# Patient Record
Sex: FEMALE | Race: OTHER | NOT HISPANIC OR LATINO | Employment: PART TIME | URBAN - METROPOLITAN AREA
[De-identification: names, ages, dates, MRNs, and addresses within clinical notes are randomized per-mention and may not be internally consistent; named-entity substitution may affect disease eponyms.]

---

## 2024-07-24 ENCOUNTER — OFFICE VISIT (OUTPATIENT)
Dept: URGENT CARE | Facility: PHYSICIAN GROUP | Age: 24
End: 2024-07-24

## 2024-07-24 VITALS
DIASTOLIC BLOOD PRESSURE: 84 MMHG | SYSTOLIC BLOOD PRESSURE: 128 MMHG | TEMPERATURE: 98.1 F | RESPIRATION RATE: 16 BRPM | BODY MASS INDEX: 23.47 KG/M2 | HEART RATE: 84 BPM | HEIGHT: 69 IN | OXYGEN SATURATION: 94 % | WEIGHT: 158.5 LBS

## 2024-07-24 DIAGNOSIS — J03.90 EXUDATIVE TONSILLITIS: ICD-10-CM

## 2024-07-24 PROCEDURE — 3074F SYST BP LT 130 MM HG: CPT | Performed by: PHYSICIAN ASSISTANT

## 2024-07-24 PROCEDURE — 99203 OFFICE O/P NEW LOW 30 MIN: CPT | Performed by: PHYSICIAN ASSISTANT

## 2024-07-24 PROCEDURE — 3079F DIAST BP 80-89 MM HG: CPT | Performed by: PHYSICIAN ASSISTANT

## 2024-07-24 RX ORDER — AMOXICILLIN 500 MG/1
500 CAPSULE ORAL 2 TIMES DAILY
Qty: 20 CAPSULE | Refills: 0 | Status: SHIPPED | OUTPATIENT
Start: 2024-07-24 | End: 2024-08-03

## 2024-07-24 ASSESSMENT — ENCOUNTER SYMPTOMS
MYALGIAS: 0
FEVER: 0
SPUTUM PRODUCTION: 0
NAUSEA: 0
ABDOMINAL PAIN: 0
SINUS PAIN: 0
CHILLS: 0
WHEEZING: 0
COUGH: 0
VOMITING: 0
SORE THROAT: 1
PALPITATIONS: 0
DIZZINESS: 0
SHORTNESS OF BREATH: 0
DIAPHORESIS: 0
HEADACHES: 0
DIARRHEA: 0

## 2024-08-10 ENCOUNTER — HOSPITAL ENCOUNTER (OUTPATIENT)
Facility: CLINIC | Age: 24
Setting detail: OBSERVATION
Discharge: HOME | End: 2024-08-11
Attending: EMERGENCY MEDICINE | Admitting: EMERGENCY MEDICINE
Payer: COMMERCIAL

## 2024-08-10 DIAGNOSIS — F60.3 BORDERLINE PERSONALITY DISORDER (H): ICD-10-CM

## 2024-08-10 DIAGNOSIS — F41.1 GAD (GENERALIZED ANXIETY DISORDER): ICD-10-CM

## 2024-08-10 DIAGNOSIS — F43.10 PTSD (POST-TRAUMATIC STRESS DISORDER): ICD-10-CM

## 2024-08-10 PROCEDURE — 250N000013 HC RX MED GY IP 250 OP 250 PS 637: Performed by: EMERGENCY MEDICINE

## 2024-08-10 PROCEDURE — 99285 EMERGENCY DEPT VISIT HI MDM: CPT

## 2024-08-10 RX ORDER — LORAZEPAM 1 MG/1
1 TABLET ORAL ONCE
Status: COMPLETED | OUTPATIENT
Start: 2024-08-10 | End: 2024-08-10

## 2024-08-10 RX ADMIN — LORAZEPAM 1 MG: 1 TABLET ORAL at 23:45

## 2024-08-10 ASSESSMENT — COLUMBIA-SUICIDE SEVERITY RATING SCALE - C-SSRS
2. HAVE YOU ACTUALLY HAD ANY THOUGHTS OF KILLING YOURSELF IN THE PAST MONTH?: NO
6. HAVE YOU EVER DONE ANYTHING, STARTED TO DO ANYTHING, OR PREPARED TO DO ANYTHING TO END YOUR LIFE?: NO
1. IN THE PAST MONTH, HAVE YOU WISHED YOU WERE DEAD OR WISHED YOU COULD GO TO SLEEP AND NOT WAKE UP?: NO

## 2024-08-10 ASSESSMENT — ACTIVITIES OF DAILY LIVING (ADL): ADLS_ACUITY_SCORE: 33

## 2024-08-11 VITALS
DIASTOLIC BLOOD PRESSURE: 57 MMHG | WEIGHT: 159.9 LBS | HEART RATE: 73 BPM | RESPIRATION RATE: 16 BRPM | OXYGEN SATURATION: 94 % | BODY MASS INDEX: 22.89 KG/M2 | HEIGHT: 70 IN | TEMPERATURE: 98.7 F | SYSTOLIC BLOOD PRESSURE: 100 MMHG

## 2024-08-11 PROBLEM — F41.9 ANXIETY: Status: ACTIVE | Noted: 2024-08-11

## 2024-08-11 PROBLEM — F43.10 POSTTRAUMATIC STRESS DISORDER: Status: ACTIVE | Noted: 2024-08-11

## 2024-08-11 PROBLEM — F41.9 ANXIETY: Status: RESOLVED | Noted: 2024-08-11 | Resolved: 2024-08-11

## 2024-08-11 PROBLEM — F43.29 ADJUSTMENT DISORDER WITH MIXED EMOTIONAL FEATURES: Status: ACTIVE | Noted: 2024-08-11

## 2024-08-11 PROBLEM — F43.29 ADJUSTMENT DISORDER WITH MIXED EMOTIONAL FEATURES: Status: RESOLVED | Noted: 2024-08-11 | Resolved: 2024-08-11

## 2024-08-11 PROBLEM — F41.1 GENERALIZED ANXIETY DISORDER: Status: ACTIVE | Noted: 2024-08-11

## 2024-08-11 PROBLEM — F60.3 BORDERLINE PERSONALITY DISORDER (H): Status: ACTIVE | Noted: 2024-08-11

## 2024-08-11 LAB — HCG UR QL: NEGATIVE

## 2024-08-11 PROCEDURE — 250N000013 HC RX MED GY IP 250 OP 250 PS 637: Performed by: NURSE PRACTITIONER

## 2024-08-11 PROCEDURE — G0378 HOSPITAL OBSERVATION PER HR: HCPCS

## 2024-08-11 PROCEDURE — 250N000013 HC RX MED GY IP 250 OP 250 PS 637: Performed by: PSYCHIATRY & NEUROLOGY

## 2024-08-11 PROCEDURE — 81025 URINE PREGNANCY TEST: CPT | Performed by: NURSE PRACTITIONER

## 2024-08-11 PROCEDURE — 99222 1ST HOSP IP/OBS MODERATE 55: CPT | Performed by: NURSE PRACTITIONER

## 2024-08-11 RX ORDER — HYDROXYZINE HYDROCHLORIDE 25 MG/1
25 TABLET, FILM COATED ORAL EVERY 6 HOURS PRN
Status: DISCONTINUED | OUTPATIENT
Start: 2024-08-11 | End: 2024-08-11 | Stop reason: HOSPADM

## 2024-08-11 RX ORDER — HYDROXYZINE HYDROCHLORIDE 50 MG/1
50 TABLET, FILM COATED ORAL EVERY 6 HOURS PRN
Status: DISCONTINUED | OUTPATIENT
Start: 2024-08-11 | End: 2024-08-11

## 2024-08-11 RX ORDER — OLANZAPINE 10 MG/2ML
10 INJECTION, POWDER, FOR SOLUTION INTRAMUSCULAR 2 TIMES DAILY PRN
Status: DISCONTINUED | OUTPATIENT
Start: 2024-08-11 | End: 2024-08-11 | Stop reason: HOSPADM

## 2024-08-11 RX ORDER — OLANZAPINE 10 MG/1
10 TABLET, ORALLY DISINTEGRATING ORAL 2 TIMES DAILY PRN
Status: DISCONTINUED | OUTPATIENT
Start: 2024-08-11 | End: 2024-08-11 | Stop reason: HOSPADM

## 2024-08-11 RX ORDER — ACETAMINOPHEN 325 MG/1
650 TABLET ORAL EVERY 4 HOURS PRN
Status: DISCONTINUED | OUTPATIENT
Start: 2024-08-11 | End: 2024-08-11 | Stop reason: HOSPADM

## 2024-08-11 RX ORDER — HYDROXYZINE HYDROCHLORIDE 25 MG/1
25 TABLET, FILM COATED ORAL EVERY 8 HOURS PRN
Qty: 30 TABLET | Refills: 0 | Status: SHIPPED | OUTPATIENT
Start: 2024-08-11 | End: 2024-08-11

## 2024-08-11 RX ORDER — TRAZODONE HYDROCHLORIDE 50 MG/1
50 TABLET, FILM COATED ORAL
Status: DISCONTINUED | OUTPATIENT
Start: 2024-08-11 | End: 2024-08-11 | Stop reason: HOSPADM

## 2024-08-11 RX ORDER — TRAZODONE HYDROCHLORIDE 50 MG/1
50 TABLET, FILM COATED ORAL
Qty: 30 TABLET | Refills: 0 | Status: SHIPPED | OUTPATIENT
Start: 2024-08-11 | End: 2024-08-11

## 2024-08-11 RX ORDER — HYDROXYZINE HYDROCHLORIDE 25 MG/1
25 TABLET, FILM COATED ORAL EVERY 8 HOURS PRN
Qty: 30 TABLET | Refills: 0 | Status: SHIPPED | OUTPATIENT
Start: 2024-08-11

## 2024-08-11 RX ORDER — LOPERAMIDE HCL 2 MG
2 CAPSULE ORAL 4 TIMES DAILY PRN
Status: DISCONTINUED | OUTPATIENT
Start: 2024-08-11 | End: 2024-08-11 | Stop reason: HOSPADM

## 2024-08-11 RX ORDER — ARIPIPRAZOLE 5 MG/1
TABLET ORAL
Qty: 30 TABLET | Refills: 0 | Status: SHIPPED | OUTPATIENT
Start: 2024-08-11 | End: 2024-08-11

## 2024-08-11 RX ORDER — TRAZODONE HYDROCHLORIDE 50 MG/1
50 TABLET, FILM COATED ORAL
Qty: 30 TABLET | Refills: 0 | Status: SHIPPED | OUTPATIENT
Start: 2024-08-11

## 2024-08-11 RX ORDER — ARIPIPRAZOLE 5 MG/1
TABLET ORAL
Qty: 30 TABLET | Refills: 0 | Status: SHIPPED | OUTPATIENT
Start: 2024-08-11

## 2024-08-11 RX ORDER — ARIPIPRAZOLE 5 MG/1
2.5 TABLET ORAL DAILY
Status: DISCONTINUED | OUTPATIENT
Start: 2024-08-11 | End: 2024-08-11 | Stop reason: HOSPADM

## 2024-08-11 RX ORDER — ONDANSETRON 4 MG/1
4 TABLET, ORALLY DISINTEGRATING ORAL EVERY 6 HOURS PRN
Status: DISCONTINUED | OUTPATIENT
Start: 2024-08-11 | End: 2024-08-11 | Stop reason: HOSPADM

## 2024-08-11 RX ORDER — IBUPROFEN 600 MG/1
600 TABLET, FILM COATED ORAL EVERY 6 HOURS PRN
Status: DISCONTINUED | OUTPATIENT
Start: 2024-08-11 | End: 2024-08-11 | Stop reason: HOSPADM

## 2024-08-11 RX ORDER — LANOLIN ALCOHOL/MO/W.PET/CERES
3 CREAM (GRAM) TOPICAL
COMMUNITY

## 2024-08-11 RX ADMIN — HYDROXYZINE HYDROCHLORIDE 25 MG: 25 TABLET, FILM COATED ORAL at 16:17

## 2024-08-11 RX ADMIN — ARIPIPRAZOLE 2.5 MG: 5 TABLET ORAL at 13:37

## 2024-08-11 RX ADMIN — ACETAMINOPHEN 650 MG: 325 TABLET, FILM COATED ORAL at 14:27

## 2024-08-11 ASSESSMENT — ACTIVITIES OF DAILY LIVING (ADL)
ADLS_ACUITY_SCORE: 35

## 2024-08-11 ASSESSMENT — COLUMBIA-SUICIDE SEVERITY RATING SCALE - C-SSRS
REASONS FOR IDEATION SINCE LAST CONTACT: COMPLETELY TO END OR STOP THE PAIN (YOU COULDN'T GO ON LIVING WITH THE PAIN OR HOW YOU WERE FEELING)
2. HAVE YOU ACTUALLY HAD ANY THOUGHTS OF KILLING YOURSELF?: NO
1. SINCE LAST CONTACT, HAVE YOU WISHED YOU WERE DEAD OR WISHED YOU COULD GO TO SLEEP AND NOT WAKE UP?: YES
SUICIDE, SINCE LAST CONTACT: NO
ATTEMPT SINCE LAST CONTACT: NO
TOTAL  NUMBER OF ABORTED OR SELF INTERRUPTED ATTEMPTS SINCE LAST CONTACT: NO
6. HAVE YOU EVER DONE ANYTHING, STARTED TO DO ANYTHING, OR PREPARED TO DO ANYTHING TO END YOUR LIFE?: NO
TOTAL  NUMBER OF INTERRUPTED ATTEMPTS SINCE LAST CONTACT: NO

## 2024-08-11 NOTE — ED PROVIDER NOTES
"  Emergency Department Note      History of Present Illness     Chief Complaint   Suicidal    HPI   Kavita Hernandez is a 24 year old female presenting to the ED for psychiatric evaluation. The patient reports that she has been diagnosed with bipolar disorder but firmly believes she has borderline personality disorder. She was unable to provide a specific stressor, but she woke up today feeling overwhelmed and kept repeating that she \"does not want to feel like this anymore\". She explains that her father killed himself a couple years ago, and she does not want to go down the same road, however she fears she will without intervention. The patient adds that Prozac has made her suicidal ideation increase in the past, so she stopped taking it. She is not currently taking any medications and states that she does not have insurance. The patient endorses drug and alcohol use, specifying past use of mushrooms and MDMA, as well as current use of THC. She last used THC yesterday. No medication allergies or other medical concerns at this time, though she states she may have had a mild case of food poisoning earlier in the week.     Independent Historian   None    Review of External Notes   None    Past Medical History     Medical History and Problem List   Bipolar disorder     Medications   The patient is currently on no regular medications.     Physical Exam     Patient Vitals for the past 24 hrs:   BP Temp Temp src Pulse Resp SpO2 Weight   08/10/24 2259 124/64 98.2  F (36.8  C) Oral 58 16 96 % 72.6 kg (160 lb)     Physical Exam  Eyes:  The pupils are equal and round    Conjunctivae and sclerae are normal  ENT:    The nose is normal    Pinnae are normal  CV:  Regular rate and rhythm     No edema  Resp:  Normal respiratory effort. Speaks in full sentencesNon-labored  MS:  Normal muscular tone    No asymmetric leg swelling  Skin:  No rash or acute skin lesions noted  Neuro:   Awake, alert.      Speech is normal and " fluent.    Face is symmetric.     Moves all extremities  Psych: Crying and endorsing depression with suicidal thoughts. Denies plan. Does not appear to be responding to internal stimuli    Diagnostics     Lab Results   Labs Ordered and Resulted from Time of ED Arrival to Time of ED Departure - No data to display    Imaging   No orders to display     Independent Interpretation   None    ED Course      Medications Administered   Medications   LORazepam (ATIVAN) tablet 1 mg (has no administration in time range)       Discussion of Management   None    ED Course   ED Course as of 08/10/24 2333   Sat Aug 10, 2024   2313 I obtained history and examined the patient as noted above. I explained findings to the patient and we discussed plan for transfer to [facility]. The patient is comfortable with this plan.        Additional Documentation  None    Medical Decision Making / Diagnosis     CMS Diagnoses: None    MIPS       None    MDM   Kavita Hernandez is a 24 year old female who presents to the emergency department with concerns about anxiety and mental health.  She reports that she has had a long day today and has been feeling very stressed.  She notes that when she gets stressed like this she has suicidal thoughts.  She denies specific plan.  She is here to try to get some help.  She had been trying to avoid using any substances recently.  Her last marijuana use was yesterday.  She had been on medications for bipolar disorder previously but is stopped after getting increasing suicidal ideation.  She is hoping to have help with diagnosis and thinks that she might have borderline personality disorder.  Patient initially agreeable to [facility].  She then later did not want to go.  Will have mental health assessment performed here in the ED instead. Patient signed out to my partner awaiting DEC evaluation.    Disposition   The patient was transferred to [facility].     Diagnosis     ICD-10-CM    1. Anxiety  F41.9          Scribe  Disclosure:  I, Jodie Estebanjaspreetvictor hugo, am serving as a scribe at 11:32 PM on 8/10/2024 to document services personally performed by Hadley Bowens MD based on my observations and the provider's statements to me.        Hadley Bowens MD  08/11/24 0112
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
## 2024-08-11 NOTE — PLAN OF CARE
Kavita HURST Mary  August 11, 2024  Plan of Care Hand-off Note     Patient Care Path: observation    Plan for Care:   Pt is help seeking and wanting to get resources to better manage her emotions. She is open to transferring to [facility] for further stabilization, assessment and meeting with psychiatry provided in the AM. Pt states that she is open to medication and referrals for DBT programs. Pt is denying SI/HI/SIB.   A lower level of care has been unsuccessful in treating and stabilizing patient s mental health symptoms. Patient will remain on [facility] unit under observation for continued monitoring, treatment and therapeutic intervention of mental health symptoms. Observation at [facility] could help mitigate the need for a more restrictive level of care in an inpatient setting.    Identified Goals and Safety Issues: decrease symptoms, engage in safety planning    Overview:  Kenneth Simmons, step-parent, [phone] Severo, significant other, ([phone])    Legal Status: Legal Status at Admission: Voluntary/Patient has signed consent for treatment    Psychiatry Consult: pt will meet with psychiatry provider at [facility] in the AM on 8/11/24       Updated Dr. Yang and RN regarding plan of care.      Yocasta Coulter, [facility], LADC

## 2024-08-11 NOTE — ED NOTES
[facility] staff came to get her, and she decided she would rather stay here to be with her friends and wait for DEC assessment.
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
## 2024-08-11 NOTE — ED PROVIDER NOTES
[facility] Unit - Combined Psychiatric Observation and Discharge note  [facility] Emergency Department  Observation Initiation Date: Aug 10, 2024    Kavita Hernandez MRN: [id number]   Age: 24 year old YOB: 2000     History     Chief Complaint   Patient presents with    Suicidal     HPI  Kavita Hernandez is a 24 year old female with a past history notable for anxiety, depression, PTSD, borderline personality disorder, and polysubstance abuse.  Patient presented to the emergency department for evaluation of irritability, agitation, and mood lability.  Patient was medically evaluated in the emergency department and determined to be medically stable for transfer to [facility] for further psychiatric assessment.  Patient is nearing 13 hours in emergency care at this time.    Here at [facility], patient was noted to have arrived to the unit after midnight, and was initially mildly agitated.  She was evaluated by the [facility] and eventually able to obtain some rest.  On interview today, patient reports a long history of mood instability, agitation, irritability.  She reports that she tends to catastrophize situations, and her mind thinks of worst case scenarios.  She reports that she lashes out verbally at others at times.  She has difficulty managing her emotions and distress.  When faced with an acute stressor, it tends to lead to a significant change in her mood.  She feels anxious and on edge with a pit in her stomach during periods of heightened emotional distress.  It takes her quite some time to reregulate when becoming emotionally dysregulated.  She endorses history of using various substances, including alcohol, THC, MDMA, psilocybin, and nicotine. She reports she last used alcohol about 1 week ago. States she uses alcohol more frequently while she is in [place] working. 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 She is working on reducing her alcohol use. She is also working to  "reduce her cannabis use. Reports she quit her nicotine vape about 3 days ago.  She reports she used MDMA last weekend, and wonders if this led to increased mood lability and agitation.  Patient notes that when she is at home in [place], she generally sleeps well from about midnight until 8 AM.  When she is in [place], she endorses a more difficult time falling and staying asleep.  Appetite is stable.  Reports she has trialed sertraline, fluoxetine, and Strattera in the past.  She felt as though fluoxetine led to increased agitation and thoughts of suicide.  She would like to avoid SSRIs.  We spoke about using a mood stabilizer, to which she was agreeable.  She currently denies any suicidal thoughts, plans, or intent.  There is no evidence of homicidal thinking, psychosis, or maxim.    Past Medical History  No past medical history on file.  No past surgical history on file.  melatonin 3 MG tablet      Allergies   Allergen Reactions    Walnuts [Nuts] Swelling     Family History  No family history on file.  Social History           Review of Systems  A medically appropriate review of systems was performed with pertinent positives and negatives noted in the HPI, and all other systems negative.    Physical Examination   BP: 124/64  Pulse: 58  Temp: 98.2  F (36.8  C)  Resp: 16  Height: 177.8 cm (5' 10\")  Weight: 72.6 kg (160 lb)  SpO2: 96 %    Physical Exam  General: Appears stated age.   Neuro: Alert and fully oriented. Extremities appear to demonstrate normal strength on visual inspection.   Integumentary/Skin: no rash visualized, normal color    Psychiatric Examination   Appearance: awake, alert, adequately groomed, appeared as age stated, and casually dressed  Attitude:  cooperative  Eye Contact:  fair  Mood:  anxious  Affect:  mood congruent and intensity is normal  Speech:  clear, coherent and normal prosody  Psychomotor Behavior:  no evidence of tardive dyskinesia, dystonia, or tics  Thought Process:  linear and " goal oriented  Associations:  no loose associations  Thought Content:  no evidence of suicidal ideation or homicidal ideation, no evidence of psychotic thought, no auditory hallucinations present, and no visual hallucinations present  Insight:  fair  Judgement:  fair  Oriented to:  time, person, and place  Attention Span and Concentration:  fair  Recent and Remote Memory:  intact  Language: able to name/identify objects without impairment  Fund of Knowledge: intact with awareness of current and past events    ED Course     ED Course as of 08/11/24 1154   Sat Aug 10, 2024   1304 I obtained history and examined the patient as noted above. I explained findings to the patient and we discussed plan for transfer to [facility]. The patient is comfortable with this plan.          Labs Ordered and Resulted from Time of ED Arrival to Time of ED Departure - No data to display    Assessments & Plan (with Medical Decision Making)   Patient presenting with anxiety, irritability, emotional lability and reactivity which has been chronic in nature, but recently worsening. Pt notes she took MDMA last weekend, which may have led to an acute worsening in agitation and anxiety, prompting her to seek help for her chronic symptoms. Pt agrees with the diagnosis of borderline personality disorder, noting she has looked at the diagnostic criteria and believes she fits all criteria. She would benefit from DBT therapy, as well as trauma therapy in the future when able to better manage distressing emotions. Nursing notes reviewed noting no acute issues.     I have reviewed the assessment completed by the [facility].     During the observation period, the patient did not require medications for agitation, and did not require restraints/seclusion for patient and/or provider safety.     The patient was found to have a psychiatric condition that would benefit from an observation stay in the emergency department for further psychiatric stabilization and/or  coordination of a safe disposition. The observation plan includes serial assessments of psychiatric condition, potential administration of medications if indicated, further disposition pending the patient's psychiatric course during the monitoring period.     Preliminary diagnosis:    ICD-10-CM    1. PTSD (post-traumatic stress disorder)  F43.10       2. Borderline personality disorder (H)  F60.3       3. NAVYA (generalized anxiety disorder)  F41.1            Treatment Plan:  - Start Abilify 2.5 mg daily for mood stabilization, with plan to increase to 5 mg daily in the next couple of days. R/b/ae discussed.   - Hydroxyzine 25 mg q6h prn for acute anxiety  - Trazodone 50 mg at bedtime prn for insomnia  - Will order urine hcg  - Pt would benefit from DBT for emotional regulation and distress tolerance skills  - Patient will remain on observation status overnight, plan for reassessment tomorrow    Patient evaluated by Rich Andres CNP on 8/11/24. Plan of care discussed with nursing and LMHP.     ADDENDUM @ 1924:  Initially, patient had planned to stay overnight on observation. This evening, she approached nursing staff to request discharge, concerned that she would be unable to sleep here. No current safety concerns. Will send prescriptions. Outpatient appointments have been scheduled. Patient discharged with safety plan in place.       After a period of working with the treatment team on the EmPATH unit, the patient's mental state improved to allow a safe transition to outpatient care. After counseling on the diagnosis, work-up, and treatment plan, the patient was discharged. Close follow-up with a psychiatrist and/or therapist was recommended and community psychiatric resources were provided. Patient is to return to the ED if any urgent or potentially life-threatening concerns.     At the time of discharge, the patient's acute suicide risk was determined to be low due to the following factors: Reduction in the  intensity of mood/anxiety symptoms that preceded the admission, denial of suicidal thoughts, denies feeling helpless or helpless, not currently under the influence of alcohol or illicit substances, denies experiencing command hallucinations, no immediate access to firearms. The patient's acute risk could be higher if noncompliant with their treatment plan, medications, follow-up appointments or using illicit substances or alcohol. Protective factors include: social supports, stable housing, employment      --  Rich Andres CNP   [facility] EMERGENCY DEPT  EmPATH Unit      Rich Andres CNP  08/11/24 1927

## 2024-08-11 NOTE — ED NOTES
[facility]  ED to EMPATH Checklist:      Goal for EMPATH: Anxiety management and Suicidality    Current Behavior: Anxious, Excitable, Sad, and Cooperative    Safety Concerns: Suicidal, no plan    Legal Hold Status: Voluntary    Medically Cleared by ED provider: Yes    Patient Therapeutically Searched: Refused    Belongings: Remain with patient    Independent Ambulation at Baseline: Yes/No: Yes    Participates in Care/Conversation: Yes/No: Yes    Patient Informed about EMPATH: Yes/No: Yes    DEC: ordered    Patient Ready to be Transferred to EMPATH? Yes/No: Yes

## 2024-08-11 NOTE — DISCHARGE INSTRUCTIONS
Your Upcoming Appointments:  Medication Management    Type: Medication Mgmt - Initial (In-Person)  Date: Wednesday, 8/14/2024  Time: 11:00 am - 12:00 pm  Provider: Stanislav Cazares MA, CNP,RN  Location: [facility], [address], [place] [address]  Phone: [phone]  Patient Instructions: Please fill New Patient Form by using following link. All forms need to be completed 24 hours prior to the appointment date/time by going to [email or web address] Please call us on [phone] 96 hours prior to your scheduled appointment to confirm that you are able to attend. We will provide you information about how to log into video call software when you call.    ----------------------------------------------------------------------------    Individual Therapy    Type: Therapy - Initial (In-Person)  Date: Friday, 8/16/2024  Time: 10:00 am - 11:00 am  Provider: Jose Luis DICKSON  [facility]  Location: [facility], [address] [place] [address] [address], [place] [address]  Phone: [phone]  Scheduling Instructions: Please get EMAIL for ALL appts! New Client Paperwork can be downloaded at [email or web address]    Intensive Outpatient Programming    Please see the attached list of upcoming appointments for your information for your scheduled diagnostic assessment, which is the first step toward accessing intensive outpatient programming through [facility].    Aftercare Plan    Follow up with established providers and supports as scheduled. Continue taking medications as prescribed. Abstain from drugs and alcohol. Utilize your [facility] mental health crisis team as needed. They are available 24/7. Contact information is listed below.     If I am feeling unsafe or I am in a crisis, I will:   Contact my established care providers  Kurtis Chacon COPE: PH: [phone]   Call the National Suicide Prevention Lifeline: 988  Go to the nearest emergency room    Call 911     Warning signs that I or other people might notice when a crisis is developing for me: changes to sleep, appetite or mood, increased anger, agitation or irritability, feeling depressed or hopeless, spending more time alone or talking less, increased crying, decreased productivity, seeing or hearing things that aren't there, thoughts of not wanting to live anymore or of actually killing myself, thoughts of hurting others    Things I am able to do on my own to cope or help me feel better: watching a favorite tv show or movie, listening to music I enjoy, going outside and breathing fresh air, going for a walk or exercising, taking a shower or bath, a cold or hot beverage, a healthy snack, drawing/coloring/painting, journaling, singing or dancing, deep breathing     I can try practicing square breathing when I begin to feel anxious - inhale through the nose for the count of 4 and the first line on the square. Exhale through the mouth for the count of 4 for the second line of the square. Repeat to complete the square. Repeat the square as many times as needed.    I can also use my five senses to practice mindfulness and grounding. What are five things I can see, four things I can hear, three things I can feel, two things I can smell, and one thing I can taste.     Things that I am able to do with others to cope or help me feel better: sometimes just talking or spending time with someone else, sharing a meal or having coffee, watching a movie or playing a game, going for a walk or exercising    I can also use community resources including mental health hotlines, [facility] crisis teams, or apps.     Things I can use or do for distraction: movies/tv, music, reading, games, drawing/coloring/painting or other art, essential oils, exercise, cleaning/organizing, puzzles, crossword puzzles, word search, Sudoku       I can also download a meditation or relaxation chandu, like Calm, Headspace, or Insight Timer (all three  "offer a free version)    Changes I can make to support my mental health and wellness: Attend scheduled mental health therapy and psychiatric appointments. Take my medications as prescribed. Maintain a daily schedule/routine. Abstain from all mood altering substances, including drugs, alcohol, or medications not currently prescribed to me. Implement a self-care routine.      People in my life that I can ask for help: friends or family, trusted teachers/staff/colleagues, trusted members of my community or place of Religion, mental health crisis lines, or 911    Your [facility] has a mental health crisis team you can call 24/7: [facility], [phone]    Other things that are important when I m in crisis: to remember that the feelings I am having right now are temporary, and it won't feel like this forever, and that it is okay and important to ask for help    Crisis Lines  Crisis Text Line  Text 685454  You will be connected with a trained live crisis counselor to provide support.    Por espanol, texto  PRAKASH a 030420 o texto a 442-AYUDAME en WhatsApp    National Hope Line  1.800.SUICIDE [7643151]      Community Resources  Fast Tracker  Linking people to mental health and substance use disorder resources  fasttrackermn.org     [place] Mental Health Warm Line  Peer to peer support  Monday thru Saturday, 12 pm to 10 pm  125.076.9825 or 0.643.450.5913  Text \"Support\" to 74259    National Kinsey on Mental Illness (MERY)  453.231.5782 or 1.888.MERY.HELPS      Mental Health Apps  My3  https://my3app.org/    VirtualHopeBox  https://[employer].org/apps/virtual-hope-box/      Additional Information  Today you were seen by a licensed mental health professional through Triage and Transition services, [facility] ([facility])  for a crisis assessment in the Emergency Department at [facility].  It is recommended that you follow up with your established providers (psychiatrist, mental " health therapist, and/or primary care doctor - as relevant) as soon as possible. Coordinators from [facility] will be calling you in the next 24-48 hours to ensure that you have the resources you need.  You can also contact [facility] coordinators directly at [phone]. You may have been scheduled for or offered an appointment with a mental health provider. [facility] maintains an extensive network of licensed behavioral health providers to connect patients with the services they need.  We do not charge providers a fee to participate in our referral network.  We match patients with providers based on a patient's specific needs, insurance coverage, and location.  Our first effort will be to refer you to a provider within your care system, and will utilize providers outside your care system as needed.

## 2024-08-11 NOTE — PROGRESS NOTES
"Patient was agitated. Yelling \"This is bullshit\" while I was explaining to her about Empath. She told me was was uncomfortable doing a clothing search as it was done in a unprofessional manner at another hospital. She angrily ripped out the strings in her hoody when  I told her strings were not allowed on the unit.  She declined Empath stating she does \"not want to be around others in crisis\" as she feels it would be too agitating to her. She was uncomfortable with the security check/clothing search and  did not want to be with out her blanket, skin care lotions and her visitors.  ED RN notified.   "

## 2024-08-11 NOTE — ED TRIAGE NOTES
"Pt c/o mental health crissis. Pt endorses SI without a plan. Pt states \"I need some fucking help right now\". Pt was redirected. Pt seeking EMPATH     Triage Assessment (Adult)       Row Name 08/10/24 2069          Triage Assessment    Airway WDL WDL        Respiratory WDL    Respiratory WDL WDL        Skin Circulation/Temperature WDL    Skin Circulation/Temperature WDL WDL        Cardiac WDL    Cardiac WDL WDL        Peripheral/Neurovascular WDL    Peripheral Neurovascular WDL WDL        Cognitive/Neuro/Behavioral WDL    Cognitive/Neuro/Behavioral WDL WDL                     "

## 2024-08-11 NOTE — ED NOTES
Pt has been present in the milieu throughout the shift. Pt attended group and made a goal for herself that she would get a treatment plan/resources in place and start medications so that she can feel better.   Pt denies SI today and reports feeling better this morning after getting medication for her anxiety last night. Pt was started on abilify and took her first dose. Pt tolerated the medication okay but did report some blurry vision along with a mild headache. Provider was notified and patient was given PRN tylenol. Plan is for patient to stay on observation tonight, try trazodone for sleep and with reassess tomorrow.

## 2024-08-11 NOTE — PROGRESS NOTES
"Patient states she has not felt like her self since  when her dad . He \"slit his wrists and shot himself on [address].\" States she has struggled with depression since that time. She came into the ER today because she is \"tired of feeling ashamed and angry all the time.\" And felt like she was \"spiraling\" down today. States she is \"ready to get  help and want help. I'm tired of feeling angry all the time\"  She denies SI as she \"doesn't want to go the same way as my chicken shit dad.\" But she does state \"I wish I wasn't alive\". She states she has limited social support and says \"all my friends stopped talking to me after my dad  because they didn't know what to say. But maybe they stopped talking to me because I am a terrible person.\" She is very tearful during intake. Her mood is liable. She is cooperative. States she has not taken any MH medications in 8 years. States she has tried Prozac but it made her suicidal so she stopped taking it and never went back to the provider. She does not have insurance. She alternates working 2 weeks at a [employer] in [place]  and then lives here in [place] for two weeks and then back again. Denies AH/VH/SI/HI.  Of note, she tells me that she took MDMA and Laila on Sat, smokes THC daily and quit Vaping \"cold\" turkey 3 days ago  "

## 2024-08-11 NOTE — PROGRESS NOTES
"27 year old female with history of Depression received from ED due to mood instability. Reports having mood \"all over the place\" . denies SI/HI. Nursing and risk assessments completed. Assessments reviewed with LMHP and physician. Admission information reviewed with patient. Patient given a tour of EmPATH and instructions on using the facility. Questions regarding EmPATH addressed. Pt safety search completed.    "

## 2024-08-12 ENCOUNTER — TELEPHONE (OUTPATIENT)
Dept: EMERGENCY MEDICINE | Facility: CLINIC | Age: 24
End: 2024-08-12
Payer: COMMERCIAL

## 2024-08-12 NOTE — TELEPHONE ENCOUNTER
Unable to reach patient. Phone kept ringing and never went to voicemail. No other phone number for patient was available in [facility] at time of call. No further follow-up is needed.

## 2024-08-12 NOTE — ED NOTES
Pt came up to nurses station requesting to discharge as she was feeling ready to go home and that she did not feel comfortable sleeping here tonight. Pt was not sure if she needed to sleep here and was willing to try gettign back home to try and get some sleep. Pt is glad she was able to receive the resources she got on the unit.Discharge instructions reviewed with patient including follow-up care plan. Educated on medication regime and advised not to stop prescribed medication without consulting their physician. Reviewed safety plan and outpatient resources/appointments.Verbalized understanding of discharge instructions. Denies SI. All belongings which where brought into the hospital have been returned to patient. Escorted off the unit @ 2000. Discharged to self.
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
